# Patient Record
Sex: MALE | Race: OTHER | Employment: FULL TIME | URBAN - METROPOLITAN AREA
[De-identification: names, ages, dates, MRNs, and addresses within clinical notes are randomized per-mention and may not be internally consistent; named-entity substitution may affect disease eponyms.]

---

## 2018-01-16 ENCOUNTER — APPOINTMENT (OUTPATIENT)
Dept: GENERAL RADIOLOGY | Age: 33
End: 2018-01-16
Attending: EMERGENCY MEDICINE
Payer: COMMERCIAL

## 2018-01-16 ENCOUNTER — HOSPITAL ENCOUNTER (OUTPATIENT)
Age: 33
Discharge: HOME | End: 2018-01-16
Attending: EMERGENCY MEDICINE
Payer: COMMERCIAL

## 2018-01-16 VITALS
RESPIRATION RATE: 18 BRPM | DIASTOLIC BLOOD PRESSURE: 82 MMHG | TEMPERATURE: 98 F | SYSTOLIC BLOOD PRESSURE: 131 MMHG | OXYGEN SATURATION: 97 % | HEART RATE: 72 BPM

## 2018-01-16 DIAGNOSIS — S86.911A KNEE STRAIN, RIGHT, INITIAL ENCOUNTER: Primary | ICD-10-CM

## 2018-01-16 PROCEDURE — 99203 OFFICE O/P NEW LOW 30 MIN: CPT

## 2018-01-16 PROCEDURE — 73560 X-RAY EXAM OF KNEE 1 OR 2: CPT | Performed by: EMERGENCY MEDICINE

## 2018-01-17 NOTE — ED INITIAL ASSESSMENT (HPI)
Pt states he frequently kicks his heel against the floor while standing as a nervous behavior. 4 days ago felt swelling and pain to right medial knee. No specific known twisting motion. States developed swelling to knee, calf and foot yesterday.     Has

## 2018-01-17 NOTE — ED PROVIDER NOTES
Patient Seen in: [facility] END    History   Patient presents with:  Lower Extremity Injury (musculoskeletal)    Stated Complaint: PAIN IN KNEE     HPI    52-year-old male presents to the immediate care complaining of a 2 day histor interruption and skin integument.   ED Course   Labs Reviewed - No data to display    ED Course as of Jan 16 2124  ------------------------------------------------------------     Xr Knee (1 Or 2 Views), Right (cpt=73560)    Result Date: 1/16/2018  [facility]